# Patient Record
Sex: FEMALE | Race: WHITE
[De-identification: names, ages, dates, MRNs, and addresses within clinical notes are randomized per-mention and may not be internally consistent; named-entity substitution may affect disease eponyms.]

---

## 2020-02-03 NOTE — HP
HISTORY AND PHYSICAL:

 

DATE OF SURGERY:  20

 

DATE OF OFFICE VISIT:  20

 

SURGEON:  Enma Padilla MD * (DICTATED BY MAGDY BAUTISTA)

 

PROCEDURE:  Right knee arthroscopy with partial meniscectomy, possible 
chondroplasty, and possible synovectomy.

 

CHIEF COMPLAINT:  Right knee pain.

 

HISTORY OF PRESENT ILLNESS:  Ms. Morales is a 59-year-old female with 
complaints of right knee pain.  An MRI confirms meniscus tear and she has 
elected to proceed with a right knee arthroscopy.

 

PAST MEDICAL HISTORY:  Sleep apnea.

 

PAST SURGICAL HISTORY:

1.  .

2.  D and C.

3.  Ganglion cyst removal.

4.  Cataract removal.

 

CURRENT MEDICATIONS:

1.  Xyzal 5 mg daily.

2.  Fish oil daily.

3.  Vitamin D.

4.  Advil as needed.

5.  Flexiplex daily.

 

ALLERGIES:  SELDANE, DARVON, ADHESIVE, and A GLAUCOMA PILL.

 

FAMILY HISTORY:  Diabetes, cancer, and stroke.

 

SOCIAL HISTORY:  She is a 59-year-old female.  She lives with her .  She 
does not smoke or use drugs.

 

REVIEW OF SYSTEMS:  A complete 14-point review of systems was reviewed with the 
patient, all negative or noncontributory.  She denies history of DVT, PE, 
hepatitis, HIV, or anesthesia problems.

 

                               PHYSICAL EXAMINATION

 

GENERAL:  She is well developed, well nourished, in no acute distress.

 

VITAL SIGNS:  She stands 62 inches tall, weighs 162 pounds.  Her blood pressure 
is 130/82, her heart rate is 74.

 

HEENT:  Normocephalic, atraumatic.

 

NECK:  Supple.  No palpable lymph nodes.

 

PULMONARY:  The lungs are clear to auscultation bilaterally.

 

CARDIO:  Regular rate and rhythm.  Strong S1, S2.

 

ABDOMEN:  Soft, nontender, nondistended.

 

MUSCULOSKELETAL:  Right lower extremity:  The skin is intact.  There are no 
open wounds or abrasions.  There is a moderate effusion, tenderness along the 
medial joint line.  Positive Apley's and Sedrick's.  Range of motion is 5 to 
125 degrees of flexion.  She is able to dorsiflex and plantar flex.  She has 2+ 
dorsalis pedis pulse and intact sensation.

 

NEUROLOGICAL:  He is alert and oriented x3.

 

 ASSESSMENT AND PLAN:  Ms. Morales is a 59-year-old female with complaint of 
right knee pain and MRI confirms a medial meniscus tear.  She has elected to 
proceed with a right knee arthroscopy with partial meniscectomy, possible 
chondroplasty, possible synovectomy, and possible plica excision.  The surgery 
is scheduled for 20 with Dr. Padilla.  Dr. Padilla discussed the risks and 
benefits of the surgery at today's visit and all of her questions were 
answered.  She will follow up with Dr. Padilla 2 weeks after the surgery.

 

 ____________________________________ MAGDY BAUTISTA

 

[id number]/CPS #: [id number]

[id number]

## 2020-02-11 ENCOUNTER — HOSPITAL ENCOUNTER (OUTPATIENT)
Dept: HOSPITAL 25 - OR | Age: 60
Discharge: HOME | End: 2020-02-11
Attending: ORTHOPAEDIC SURGERY
Payer: COMMERCIAL

## 2020-02-11 VITALS — DIASTOLIC BLOOD PRESSURE: 76 MMHG | SYSTOLIC BLOOD PRESSURE: 131 MMHG
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
## 2020-02-11 DIAGNOSIS — G47.33: ICD-10-CM

## 2020-02-11 DIAGNOSIS — K21.9: ICD-10-CM

## 2020-02-11 DIAGNOSIS — S83.241A: Primary | ICD-10-CM
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
## 2020-02-11 DIAGNOSIS — S83.281A: ICD-10-CM

## 2020-02-11 RX ADMIN — OXYCODONE HYDROCHLORIDE PRN MG: 5 CAPSULE ORAL at 09:17

## 2020-02-11 RX ADMIN — OXYCODONE HYDROCHLORIDE PRN MG: 5 CAPSULE ORAL at 08:42

## 2020-02-12 NOTE — OP
OPERATIVE REPORT:

 

DATE OF OPERATION:  02/11/20

 

DATE OF BIRTH:  [birth year].

 

ATTENDING SURGEON:  Enma Padilla MD

 

ASSISTANT:  MAGDY Guadarrama Ms. did help throughout the procedure with preparation of the leg, wound retraction and galileo
pulation of the knee and wound closure.

 

ANESTHESIOLOGIST:  Dr. Damon.

 

ANESTHESIA:  General.

 

PRE-OP DIAGNOSES:  Right knee medial meniscal tear, mild to moderate arthritic changes.

 

POST-OP DIAGNOSES:  Right knee medial meniscal tear, lateral meniscal tear, severe degenerative osteo
arthritis.

 

OPERATIVE PROCEDURES:  Right knee arthroscopy with partial medial meniscectomy and partial lateral me
niscectomy.

 

BRIEF HISTORY/INDICATIONS:  Ms. Morales presented with acute onset of mechanical symptoms and incr
ease in her right knee pain.  MRI confirmed a medial meniscal tear.  She had failed conservative loida
tment and we discussed the possibility of arthroscopy with partial meniscectomy.  Informed consent wa
s obtained from the patient.  She understood the risks of surgery included, but were not limited to b
leeding, infection, damage to nearby structures, continued pain, need for further surgery, retear of 
the meniscus, progression of arthritis, stroke, heart attack, blood clot and death.  She wished to pr
oceed.

 

INTRAOPERATIVE FINDINGS:  Intraoperatively, the patient had a parrot-beak type tear along in the post
erior one-third of medial meniscus in the red-white zone.  She had a radial type tear in the posterio
r horn of the lateral meniscus.  This is in the white-red zone.  The patient had grade 3 and 4 Outerb
ridge cartilage changes in both the medial and patellofemoral compartments with exposed subchondral b
one and complete loss of cartilage.

 

COMPLICATIONS:  None.

 

ESTIMATED BLOOD LOSS:  Less than 25 cc.

 

SPECIMENS:  None.

 

DESCRIPTION OF PROCEDURE:  Ms. Morales was identified in the preanesthesia unit. Her right lower e
xtremity was marked as the correct operative side.  Informed consent was signed and placed in the [facility]
rt.  The patient was taken to the operating room and placed under anesthesia without complication.  R
ight lower extremity was prepped and draped in the usual sterile fashion.  Preop time-out was made to
 correctly identify the patient's side and site.  Appropriate perioperative antibiotics were given wi
thin 1 hour of incision.

 

A standard anterolateral portal incision of 0.5 cm was made with a 15 blade and carried down to the c
apsule.  Trocar was introduced.  As soon as the light and water sources were turned on, there was imm
ediate visualization of the suprapatellar pouch.

 

A tour of the knee joint was performed.

 

Suprapatellar pouch had no obvious abnormalities.  Patellofemoral compartment showed exposed subchond
ral bone with extreme loss of cartilage.  These were 3 and 4 Outerbridge cartilage changes.  The medi
al gutter showed no loose body or plica. Medial compartment showed a grade 3 and 4 Outerbridge cartil
age changes with large area of exposed subchondral bone.  There was a parrot-beak type tear in the po
sterior medial meniscus.  This tear was displaced anteriorly and some joint space.  ACL and PCL appea
red to be intact.

 

The knee was placed in a figure-of-4 position.  The lateral compartment was evaluated.  There were mi
nimal degenerative changes.  There was a radial tear in the posterior horn of the lateral meniscus.  
Lateral gutter had no abnormality or loose body.  Under direct visualization, a medial portal incisio
n was made.

 

Probe was introduced.  A second tour of the knee joint was performed.  No additional findings were no
zaina.  Straight biter was introduced and the torn medial meniscus was carefully excised.  Shaver and r
adiofrequency ablation wand were used to further smooth the edge of the meniscus.  Partial meniscecto
my was carried out mainly in the red-white zone.  Further probing of the medial meniscus showed no ad
ditional tears.

 

The knee was then placed in a figure-of-4 position.  Shaver and radiofrequency ablation wand were use
d to perform partial lateral meniscectomy.  The tear excised in the red-white zone of the posterior h
orn of the lateral meniscus.

 

The knee was copiously irrigated with sterile saline.  All instruments were removed.  The incisions w
ere closed using 3-0 nylon suture.  Sterile Xeroform, 4x4s and Webril were used to cover the incision
.  Ace wrap and cold pack were placed over this.  The patient's anesthesia was reversed without diffi
culty.  She was taken to the PACU in stable condition.  Intended weightbearing will be weightbearing 
as tolerated.  Intended DVT prophylaxis will be aspirin.  She will follow up in 2 weeks' time for a omi alvares.

 

 [id number]/CPS #: [id number]